# Patient Record
(demographics unavailable — no encounter records)

---

## 2025-02-05 NOTE — ASSESSMENT
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  She will return in 1 year for reevaluation.  I have again explained to the mother the nature of this problem and the possibility of worsening of the atrophy and the leg length inequality.

## 2025-02-05 NOTE — CONSULT LETTER
[Dear  ___] : Dear  [unfilled], [Consult Letter:] : I had the pleasure of evaluating your patient, [unfilled]. [Please see my note below.] : Please see my note below. [Consult Closing:] : Thank you very much for allowing me to participate in the care of this patient.  If you have any questions, please do not hesitate to contact me. [Sincerely,] : Sincerely, [FreeTextEntry3] : Dr Duarte

## 2025-02-05 NOTE — PHYSICAL EXAM
[FreeTextEntry1] : On physical examination the child's gait is normal.  There is a full range of motion of the hips knees ankles and subtalar joints.  It is noted that she still has a mild degree of left thigh atrophy compared to the right.  There is no longer a leg length inequality that is obvious.

## 2025-02-05 NOTE — HISTORY OF PRESENT ILLNESS
[FreeTextEntry1] : This 22-month-old female returns for reevaluation of a left hemiatrophy and leg length inequality.  The child has no limp at this time.  Her gait is normal.